# Patient Record
Sex: FEMALE | Race: WHITE | HISPANIC OR LATINO | Employment: STUDENT | URBAN - METROPOLITAN AREA
[De-identification: names, ages, dates, MRNs, and addresses within clinical notes are randomized per-mention and may not be internally consistent; named-entity substitution may affect disease eponyms.]

---
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
## 2023-10-14 NOTE — ED PROVIDER NOTES
"Encounter Date: 8/10/2018       History     Chief Complaint   Patient presents with    Abdominal Pain     lower abdominal pain, denies N/V, reports period ended 2 days ago but pain remaining     18yo previously healthy female presents to the ED for lower abd pain for two days.  Pt states that she is having some light vaginal bleeding, but suspects that her period is lasting longer than usual.  Her LMP started 6 days ago, and it usually only lasts about four days. She is using about three pads a day. She is not passing tissue or clots.  She denies light-headedness, dizziness, SOB, and dysuria. She reports the lower abd pain feels like "cramping" and is intermittent.  She is unsure what makes the pain worse, but midol does seem to help.  She is sexually active and uses condoms consistently.  She denies concern for and previous history of STI.  This is the extent of the patient's complaints at this time.       The history is provided by the patient.   Female  Problem   Primary symptoms include pelvic pain, vaginal bleeding.    Primary symptoms include no fever, no dyspareunia, no genital lesions, no genital rash, no genital itching, no dysuria.   This is a new problem. The current episode started two days ago. The problem occurs intermittently. The problem has been unchanged. The symptoms occur spontaneously. She is not pregnant. Pregnancies:  - 0, Para - Abortions (including miscarriages) -. LMP: now. Associated symptoms include abdominal pain. Pertinent negatives include no anorexia, no diaphoresis, no abdominal swelling, no constipation, no diarrhea, no nausea, no vomiting, no frequency, no light-headedness and no dizziness. She has tried NSAIDs for the symptoms. Sexual activity: sexually active. There is no concern regarding sexually transmitted diseases. She uses condoms for contraception. Associated medical issues do not include STD, vaginosis, ovarian cysts or gynecological surgery.     Review of " patient's allergies indicates:  No Known Allergies  History reviewed. No pertinent past medical history.  History reviewed. No pertinent surgical history.  History reviewed. No pertinent family history.  Social History   Substance Use Topics    Smoking status: Never Smoker    Smokeless tobacco: Not on file    Alcohol use No     Review of Systems   Constitutional: Positive for activity change. Negative for appetite change, diaphoresis and fever.   HENT: Negative for congestion.    Respiratory: Negative for cough and shortness of breath.    Cardiovascular: Negative for chest pain.   Gastrointestinal: Positive for abdominal pain. Negative for anorexia, constipation, diarrhea, nausea and vomiting.   Genitourinary: Positive for pelvic pain and vaginal bleeding. Negative for difficulty urinating, dyspareunia, dysuria, frequency, hematuria and vaginal discharge.   Musculoskeletal: Negative for back pain.   Skin: Negative for rash.   Neurological: Negative for dizziness and light-headedness.   Hematological: Does not bruise/bleed easily.   Psychiatric/Behavioral: Negative for confusion.       Physical Exam     Initial Vitals [08/10/18 1006]   BP Pulse Resp Temp SpO2   130/78 72 16 98.3 °F (36.8 °C) 100 %      MAP       --         Physical Exam    Nursing note and vitals reviewed.  Constitutional: Vital signs are normal. She appears well-developed and well-nourished. She is active and cooperative.  Non-toxic appearance. She does not have a sickly appearance. She does not appear ill.   HENT:   Head: Normocephalic and atraumatic.   Eyes: Conjunctivae and EOM are normal.   Neck: Normal range of motion. Neck supple.   Cardiovascular: Normal rate and regular rhythm.   Pulmonary/Chest: Effort normal and breath sounds normal.   Abdominal: Soft. Normal appearance and bowel sounds are normal.   Genitourinary: Uterus normal. Pelvic exam was performed with patient supine. No labial fusion. There is no rash, tenderness, lesion or  injury on the right labia. There is no rash, tenderness, lesion or injury on the left labia. Cervix exhibits no motion tenderness, no discharge and no friability. Right adnexum displays no mass, no tenderness and no fullness. Left adnexum displays no mass, no tenderness and no fullness. There is bleeding in the vagina. No erythema or tenderness in the vagina. No foreign body in the vagina. No signs of injury around the vagina. No vaginal discharge found.   Genitourinary Comments: Witnessed by ELMIRA Will  No CMT.    Neurological: She is alert and oriented to person, place, and time. She has normal strength. GCS eye subscore is 4. GCS verbal subscore is 5. GCS motor subscore is 6.   Skin: Skin is warm, dry and intact. No rash noted.   Psychiatric: She has a normal mood and affect. Her speech is normal and behavior is normal. Judgment and thought content normal. Cognition and memory are normal.         ED Course   Procedures  Labs Reviewed   URINALYSIS - Abnormal; Notable for the following:        Result Value    Appearance, UA Hazy (*)     Occult Blood UA 3+ (*)     Leukocytes, UA Trace (*)     All other components within normal limits   CBC W/ AUTO DIFFERENTIAL - Abnormal; Notable for the following:     Gran% 73.7 (*)     Lymph% 19.8 (*)     All other components within normal limits   URINALYSIS MICROSCOPIC - Abnormal; Notable for the following:     RBC, UA >100 (*)     All other components within normal limits   C. TRACHOMATIS/N. GONORRHOEAE BY AMP DNA   COMPREHENSIVE METABOLIC PANEL   POCT URINE PREGNANCY          Imaging Results          US Pelvis Comp with Transvag NON-OB (xpd (Final result)  Result time 08/10/18 11:45:53    Final result by Latrell Brian MD (08/10/18 11:45:53)                 Impression:      No sonographic evidence of ovarian torsion.    Trace free fluid in the right adnexal fossa.      Electronically signed by: Latrell Brian MD  Date:    08/10/2018  Time:    11:45             Narrative:     EXAMINATION:  US PELVIS COMP WITH TRANSVAG NON-OB (XPD)    CLINICAL HISTORY:  abd pain;    TECHNIQUE:  Transabdominal sonography of the pelvis was performed, followed by transvaginal sonography to better evaluate the uterus and ovaries.    COMPARISON:  None.    FINDINGS:  The uterus is normal in echotexture and size, measuring 7.1 x 2.6 x 3.7 cm.  The endometrium is unremarkable, measuring 0.4 cm in thickness.  No uterine fibroid.  Unremarkable cervix.    The ovaries are symmetric in size.  The right ovary measures 2.2 x 1.1 x 1.8 cm, and the left measures 1.9 x 0.6 x 1.3 cm.  The ovaries are otherwise unremarkable.    Duplex Doppler images show symmetric and expected bilateral ovarian vascularity.    Trace free fluid in the right adnexal fossa.                                 Medical Decision Making:   Initial Assessment:   16yo female here for lower abd cramping for two days with vaginal bleeding.  No trauma, fever/chills, CP, SOB, dizziness, vomiting, diarrhea, vaginal discharge, or dysuria.  Pt appears well, nontoxic.  Mild suprapubic tenderness.  No r/r/g, distention or CVA tenderness.  Moderate amount of dark red blood in vault.  No adnexal tenderness or mass.    Differential Diagnosis:   Dysmenorrhea, DUB, anemia, ovarian cyst, ovarian torsion, pregnancy, ectopic, miscarriage, STI, PID, TOA  Clinical Tests:   Lab Tests: Ordered and Reviewed  Radiological Study: Ordered and Reviewed  ED Management:  Labs, IV fluids, US pelvis, pelvic exam, toradol, rocephin, zithromax  H&H stable. WBC normal.  US negative for torsion and mass.  UA negative for infection.  Pt was treated empirically for GC.  She reports that her pain has significantly improved after ED interventions.  I advised safe sex practices and follow up with her gyn within 3 days.  Return to the ER if her condition changes, progresses, or if there are any concerns.  The patient's symptoms seem to be due to dysmenorrhea.  Patient verbalized understanding,  compliance, and agreement with the treatment plan.                      Clinical Impression:   The encounter diagnosis was Dysmenorrhea.                             ELVIA Anderson  08/10/18 1203       ELVIA Anderson  08/10/18 1213     AMS